# Patient Record
Sex: MALE | Race: WHITE
[De-identification: names, ages, dates, MRNs, and addresses within clinical notes are randomized per-mention and may not be internally consistent; named-entity substitution may affect disease eponyms.]

---

## 2018-12-23 ENCOUNTER — HOSPITAL ENCOUNTER (EMERGENCY)
Dept: HOSPITAL 88 - ER | Age: 73
Discharge: HOME | End: 2018-12-23
Payer: MEDICARE

## 2018-12-23 VITALS — WEIGHT: 145 LBS | HEIGHT: 65 IN | BODY MASS INDEX: 24.16 KG/M2

## 2018-12-23 DIAGNOSIS — S92.414A: ICD-10-CM
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
## 2018-12-23 DIAGNOSIS — M79.671: Primary | ICD-10-CM
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
## 2018-12-23 PROCEDURE — 99283 EMERGENCY DEPT VISIT LOW MDM: CPT

## 2018-12-23 NOTE — DIAGNOSTIC IMAGING REPORT
FOOT RIGHT COMPLETE



Comparison: None

Clinical history: Crate fell on foot, pain



Findings:

See impression. Mild midfoot and first MTP degenerative changes. Plantar

calcaneal spur. Vascular calcifications. 



Impression:

Comminuted, essentially nondisplaced fracture of the first digit proximal

phalanx with intra-articular extension into the IP joint. Associated soft

tissue swelling.



Signed by: Dr Tia Juarez MD on 12/23/2018 3:55 AM

## 2019-12-27 ENCOUNTER — HOSPITAL ENCOUNTER (OUTPATIENT)
Dept: HOSPITAL 88 - NM | Age: 74
End: 2019-12-27
Attending: INTERNAL MEDICINE
Payer: MEDICARE

## 2019-12-27 DIAGNOSIS — M85.80: Primary | ICD-10-CM

## 2019-12-27 PROCEDURE — 78306 BONE IMAGING WHOLE BODY: CPT

## 2019-12-28 NOTE — DIAGNOSTIC IMAGING REPORT
Bone Scan, delayed phase 



INDICATION: Other specified disorder of bone



COMPARISON: None



REPORT: Approximately 3 hours following intravenous administration of 25 mCi of

Tc-99m MDP, delayed total body images in the anterior and posterior projections

 and selected spot images were obtained. 



Focal moderate to markedly increased tracer activity is seen on the right side

of T8 and in the midline of L4.  Otherwise, distribution of tracer activity is

unremarkable throughout the skeletal system.    



No abnormal accumulation of tracer is seen in the soft tissues or urinary

tract.



IMPRESSION:



Acute osteoblastic in T8 and L4 are nonspecific in appearance and may be due to

an post-traumatic or inflammatory/degenerative process; less likely neoplastic

process in this patient without a diagnosis of cancer.





Signed by: Dr. Kitty Carreno M.D. on 12/28/2019 8:58 AM

## 2020-04-28 ENCOUNTER — HOSPITAL ENCOUNTER (EMERGENCY)
Dept: HOSPITAL 88 - ER | Age: 75
Discharge: HOME | End: 2020-04-28
Payer: MEDICARE

## 2020-04-28 VITALS — WEIGHT: 145 LBS | BODY MASS INDEX: 24.16 KG/M2 | HEIGHT: 65 IN

## 2020-04-28 VITALS — DIASTOLIC BLOOD PRESSURE: 78 MMHG | SYSTOLIC BLOOD PRESSURE: 135 MMHG

## 2020-04-28 DIAGNOSIS — I10: ICD-10-CM

## 2020-04-28 DIAGNOSIS — K21.9: ICD-10-CM

## 2020-04-28 DIAGNOSIS — E11.9: ICD-10-CM

## 2020-04-28 DIAGNOSIS — I48.91: ICD-10-CM

## 2020-04-28 DIAGNOSIS — M43.6: Primary | ICD-10-CM

## 2020-04-28 DIAGNOSIS — E78.5: ICD-10-CM

## 2020-04-28 PROCEDURE — 99283 EMERGENCY DEPT VISIT LOW MDM: CPT

## 2020-04-28 NOTE — XMS REPORT
Patient Summary Document

                             Created on: 2020



MARIBEL MIRANDA

External Reference #: [id number]

: 1945

Sex: Male



Demographics





                          Address                   [address]

[place]  [address]

 

                          Home Phone                [phone]

 

                          Preferred Language        Unknown

 

                          Marital Status            Unknown

 

                          Catholic Affiliation     Unknown

 

                          Race                      Unknown

 

                          Additional Race(s)        White



 

                          Ethnic Group              Unknown





Author





                          Author                    [facility]

t

 

                          Organization              [facility]

 

                          Address                   Unknown

 

                          Phone                     Unavailable







Support





                Name            Relationship    Address         Phone

 

                KELLY AHUMADA  Caregiver       Unknown         Unavailable

 

                NONSTAFF        Caregiver       Unknown         Unavailable

 

                NONSTAFF        Caregiver       Unknown         Unavailable

 

                    DESHAWN BETTENCOURT MD Caregiver           [address]

[place]  [address]                 Unavailable

 

                    SALVATORE NARVAEZ   Next Of Kin         [address] [place]  [address] [phone]







Care Team Providers





                    Care Team Member Name Role                Phone

 

                    NONSTAFF            PP                  Unavailable

 

                    CONOR POLLARD      Unavailable         Unavailable

 

                    DESHAWN BETTENCOURT    Unavailable         Unavailable







Payers





             Payer Name   Policy Type  Policy Number Effective Date Expiration D

ate

 

             [employer]              [id number]  2009

 00:00:00  

 

             [facility]              [id number]                







Problems





           Condition Name Condition Details Condition Category Status     Onset 

Date Resolution

Date                Last Treatment Date Treating Clinician  Comments

 

        Urinary tract infection Urinary tract infection Problem Active          

                         







Allergies, Adverse Reactions, Alerts

This patient has no known allergies or adverse reactions.



Medications





             Ordered Medication Name Filled Medication Name Start Date   Stop Da

te    Current 

Medication? Ordering Clinician Indication Dosage     Frequency  Signature (SIG) 

Comments                                Components

 

       Allopurinol 300 Mg Tablet Allopurinol 300 Mg Tablet               Yes    

              300           Twice A 

Day                                                  

 

      Atenolol 50 Mg Tablet Atenolol 50 Mg Tablet             Yes               

            Daily        

 

      Lisinopril 10 Mg Tablet Lisinopril 10 Mg Tablet             Yes           

    40          Daily        

 

       Metformin Hcl 500 Mg Tablet Metformin Hcl 500 Mg Tablet               Yes

                  500           Twice 

A Day                                                

 

        Pravastatin Sodium 40 Mg Tablet Pravastatin Sodium 40 Mg Tablet         

        Yes                     40       

                    Bedtime                                  

 

       Ranitidine Hcl 300 Mg Tablet Ranitidine Hcl 300 Mg Tablet               Y

es                                

Bedtime                                              

 

        Tamsulosin Hcl 0.4 Mg Cap.er.24h Tamsulosin Hcl 0.4 Mg Cap.er.24h       

          Yes                             

                    Daily                                    







Encounters





             Start Date/Time End Date/Time Encounter Type Admission Type Attendi

[facility]       Care Department     Encounter ID

 

             2018 01:40:00 2018 05:19:00 Departed Emergency Room 1  

          [facility]              [id number]







Results





           Test Description Test Time  Test Comments Text Results Atomic Results

 Result 

Comments

 

                BONE DXA DUAL ENERGY 2020 16:19:00                        

                              

                                                [place] [address]      Patient Name: MARIBEL MIRANDA                               
   MR #: [id number]                     : 1945                         
         Age/Sex: 74/M  Acct #: [id number]                              Req #:
[id number]  Adm Physician:                                                      
Ordered by: AMANDEEP POLLARD MD                            Report #: [id number]    
   Location: DX                                      Room/Bed:                  
  
________________________________________________________________________________

___________________    Procedure: [id number] DX/BONE DXA DUAL ENERGY  Exam Date: 
                           Exam Time:                                           
   REPORT STATUS: Signed    Exam:  Bone mineral density study.      History:  
74-year-old male.      Comparison:  None.      Discussion:    Evaluation of the 
left hip and lumbar spine was performed utilizing DEXA   Hologic bone 
densitometer.    The study is technically adequate.   The patient's fracture 
risk is compared to an age-matched control.      Lumbar spine total bone mineral
density:  1.367 gm/cm2, T-score is 2.5, Z-score   is 3.6. No previous 
comparison.      Left femoral neck bone mineral density:  0.805 g/cm2, T-score 
is -0.9, Z-score   is 0.4. No previous comparison..         Impression:   Bone 
mineralization by WHO Classification using T score is normal, fracture   risk is
low.           <T score:   NL = -1 or higher   Osteopenia = -1 to -2.5   
Osteoporosis = -2.5 or lower      Z score:   < - 1.5 concerning for path>      
Recommendations:   Medical evaluation for secondary causes of low bone mineral 
density may be   appropriate.      Correlate clinically for the necessity and 
timing of the next bone mineral   density study.      National Osteoporosis 
Foundation recommendations:    Initiate therapy to reduce fracture risk in 
postmenopausal women with    -BMD t-scores below -2 by central DXA with no risk 
factors   -BMD t-scores below -1.5 by central DXA with one or more risk factors 
(first   deg relative with hip fracture, prior personal fracture, low body 
weight,   smoking)   -A prior vertebral or hip fracture      AACE (Clinical 
Endocrinology) recommends treating the following:    Postmenopausal women who 
have osteoporosis as diagnosed by fragility fractures   or t scores -2.5 or 
below   Postmenopausal women who have risk factors (including fh of hip fractur
e, low   body weight, smoking, risk of falling, high bone turnover, advancing 
age) and   borderline low BMD   T scores of -1.5 or below    Adequate intake of 
calcium (at least 1200mg/day) and vitamin D (400-800   IU/day).    Regular 
weight bearing and muscle - strengthening exercises   Avoid smoking and 
excessive alcohol      Signed by: Jr Devi on 2020 4:20 PM        
Dictated By: JR DEVI MD  Electronically Signed By: JR DEVI MD on 
20  Transcribed By: RICARDO on 20 1620       COPY TO:   
AMANDEEP POLLARD MD                         

 

                BONE and/or JOINT WHOLE BODY 2019 08:49:00                

                              

                                                        [place] [address]      Patient Name: MARIBEL MIRANDA                     
             MR #: [id number]                     : 1945               
                   Age/Sex: 74/M  Acct #: [id number]                          
   Req #: [id number]  Adm Physician:                                            
         Ordered by: AMANDEEP POLLARD MD                            Report #: 
[id number]        Location: NM                                      Room/Bed:    
                
________________________________________________________________________________

___________________    Procedure: [id number] NM/BONE and/or JOINT WHOLE BODY  
Exam Date: 19                            Exam Time: 1000                  
                           REPORT STATUS: Signed    Bone Scan, delayed phase    
  INDICATION: Other specified disorder of bone      COMPARISON: None      
REPORT: Approximately 3 hours following intravenous administration of 25 mCi of 
 Tc-99m MDP, delayed total body images in the anterior and posterior projections
   and selected spot images were obtained.       Focal moderate to markedly 
increased tracer activity is seen on the right side   of T8 and in the midline 
of L4.  Otherwise, distribution of tracer activity is   unremarkable throughout 
the skeletal system.          No abnormal accumulation of tracer is seen in the 
soft tissues or urinary   tract.      IMPRESSION:      Acute osteoblastic in T8 
and L4 are nonspecific in appearance and may be due to   an post-traumatic or 
inflammatory/degenerative process; less likely neoplastic   process in this 
patient without a diagnosis of cancer.         Signed by: Dr. Linsey Carreno M.D. 
on 2019 8:58 AM        Dictated By: LINSEY CARRENO MD  Electronically Signed 
By: LINSEY CARRENO MD on 19  Transcribed By: RICARDO on 19   
   COPY TO:   AMANDEEP POLLARD MD           

 

                FOOT RIGHT COMPLETE 2018 03:51:00                         

                              

                                               [place] [address]
     Patient Name: MARIBEL MIRANDA                                   MR #: 
[id number]                     : 1945                                  
Age/Sex: 73/M  Acct #: [id number]                              Req #: [id number]
[id number]  Adm Physician:                                                      
Ordered by: JUDY BETTENCOURT MD                            Report #: [id number]  
     Location: ER                                      Room/Bed:                
    
________________________________________________________________________________

___________________    Procedure: [id number] DX/FOOT RIGHT COMPLETE  Exam Date: 
18                            Exam Time: 0305                             
                REPORT STATUS: Signed    FOOT RIGHT COMPLETE      Comparison: 
None   Clinical history: Crate fell on foot, pain      Findings:   See 
impression. Mild midfoot and first MTP degenerative changes. Plantar   calcaneal
spur. Vascular calcifications.       Impression:   Comminuted, essentially 
nondisplaced fracture of the first digit proximal   phalanx with intra-articular
extension into the IP joint. Associated soft   tissue swelling.      Signed by: 
Dr Adriano Juarez MD on 2018 3:55 AM        Dictated By: ADRIANO JUAREZ MD  Electronically Signed By: ADRIANO JUAREZ MD on 18  Transcribed 
By: RICARDO on 18       COPY TO:   JUDY BETTENCOURT MD

## 2021-12-26 ENCOUNTER — HOSPITAL ENCOUNTER (EMERGENCY)
Dept: HOSPITAL 88 - ER | Age: 76
Discharge: HOME | End: 2021-12-26
Payer: MEDICARE

## 2021-12-26 VITALS — WEIGHT: 145 LBS | BODY MASS INDEX: 24.16 KG/M2 | HEIGHT: 65 IN

## 2021-12-26 DIAGNOSIS — E11.9: ICD-10-CM

## 2021-12-26 DIAGNOSIS — N39.0: ICD-10-CM

## 2021-12-26 DIAGNOSIS — E78.5: ICD-10-CM

## 2021-12-26 DIAGNOSIS — Z87.442: ICD-10-CM

## 2021-12-26 DIAGNOSIS — I48.91: ICD-10-CM

## 2021-12-26 DIAGNOSIS — K21.9: ICD-10-CM

## 2021-12-26 DIAGNOSIS — R39.15: Primary | ICD-10-CM

## 2021-12-26 DIAGNOSIS — I10: ICD-10-CM

## 2021-12-26 LAB
BACTERIA URNS QL MICRO: (no result) /HPF
CLARITY UR: (no result)
COLOR UR: YELLOW
DEPRECATED RBC URNS MANUAL-ACNC: (no result) /HPF (ref 0–5)
EPI CELLS URNS QL MICRO: (no result) /LPF
KETONES UR QL STRIP.AUTO: NEGATIVE
LEUKOCYTE ESTERASE UR QL STRIP.AUTO: (no result)
NITRITE UR QL STRIP.AUTO: NEGATIVE
PROT UR QL STRIP.AUTO: (no result)
SP GR UR STRIP: 1.02 (ref 1.01–1.02)
UROBILINOGEN UR STRIP-MCNC: 4 MG/DL (ref 0.2–1)
WBC #/AREA URNS HPF: >50 /HPF (ref 0–5)

## 2021-12-26 PROCEDURE — 81001 URINALYSIS AUTO W/SCOPE: CPT

## 2022-07-17 ENCOUNTER — HOSPITAL ENCOUNTER (EMERGENCY)
Dept: HOSPITAL 88 - ER | Age: 77
Discharge: HOME | End: 2022-07-17
Payer: MEDICARE

## 2022-07-17 VITALS — BODY MASS INDEX: 25.73 KG/M2 | HEIGHT: 72 IN | WEIGHT: 190 LBS

## 2022-07-17 DIAGNOSIS — K21.9: ICD-10-CM

## 2022-07-17 DIAGNOSIS — I48.91: ICD-10-CM

## 2022-07-17 DIAGNOSIS — I10: ICD-10-CM

## 2022-07-17 DIAGNOSIS — E78.5: ICD-10-CM

## 2022-07-17 DIAGNOSIS — F03.90: ICD-10-CM

## 2022-07-17 DIAGNOSIS — M25.571: Primary | ICD-10-CM

## 2022-07-17 DIAGNOSIS — E11.9: ICD-10-CM

## 2022-07-17 DIAGNOSIS — Z87.442: ICD-10-CM

## 2022-07-17 PROCEDURE — 99282 EMERGENCY DEPT VISIT SF MDM: CPT

## 2024-11-09 ENCOUNTER — HOSPITAL ENCOUNTER (EMERGENCY)
Dept: HOSPITAL 88 - ER | Age: 79
Discharge: HOME | End: 2024-11-09
Payer: MEDICARE

## 2024-11-09 VITALS — BODY MASS INDEX: 22.49 KG/M2 | WEIGHT: 135 LBS | HEIGHT: 65 IN

## 2024-11-09 VITALS
HEART RATE: 69 BPM | TEMPERATURE: 97.7 F | RESPIRATION RATE: 17 BRPM | DIASTOLIC BLOOD PRESSURE: 73 MMHG | OXYGEN SATURATION: 98 % | SYSTOLIC BLOOD PRESSURE: 149 MMHG

## 2024-11-09 VITALS — HEART RATE: 68 BPM | TEMPERATURE: 98.5 F

## 2024-11-09 DIAGNOSIS — S61.211A: Primary | ICD-10-CM
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
## 2024-11-09 DIAGNOSIS — Z79.01: ICD-10-CM
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
## 2024-11-09 PROCEDURE — 99282 EMERGENCY DEPT VISIT SF MDM: CPT
